# Patient Record
Sex: FEMALE | Race: WHITE | NOT HISPANIC OR LATINO | Employment: OTHER | URBAN - METROPOLITAN AREA
[De-identification: names, ages, dates, MRNs, and addresses within clinical notes are randomized per-mention and may not be internally consistent; named-entity substitution may affect disease eponyms.]

---

## 2023-03-27 ENCOUNTER — LAB (OUTPATIENT)
Dept: LAB | Facility: HOSPITAL | Age: 38
End: 2023-03-27
Payer: COMMERCIAL

## 2023-03-27 ENCOUNTER — TRANSCRIBE ORDERS (OUTPATIENT)
Dept: LAB | Facility: HOSPITAL | Age: 38
End: 2023-03-27
Payer: COMMERCIAL

## 2023-03-27 DIAGNOSIS — Z32.01 PREGNANCY EXAMINATION OR TEST, POSITIVE RESULT: ICD-10-CM

## 2023-03-27 DIAGNOSIS — N92.6 IRREGULAR MENSTRUAL CYCLE: ICD-10-CM

## 2023-03-27 DIAGNOSIS — N92.6 IRREGULAR MENSTRUAL CYCLE: Primary | ICD-10-CM

## 2023-03-27 PROCEDURE — 36415 COLL VENOUS BLD VENIPUNCTURE: CPT

## 2023-03-27 PROCEDURE — 84702 CHORIONIC GONADOTROPIN TEST: CPT

## 2023-03-27 PROCEDURE — 84144 ASSAY OF PROGESTERONE: CPT

## 2023-03-28 LAB
HCG INTACT+B SERPL-ACNC: NORMAL MIU/ML
PROGEST SERPL-MCNC: 8.5 NG/ML

## 2023-03-29 ENCOUNTER — LAB (OUTPATIENT)
Dept: LAB | Facility: HOSPITAL | Age: 38
End: 2023-03-29
Payer: COMMERCIAL

## 2023-03-29 ENCOUNTER — TRANSCRIBE ORDERS (OUTPATIENT)
Dept: LAB | Facility: HOSPITAL | Age: 38
End: 2023-03-29
Payer: COMMERCIAL

## 2023-03-29 DIAGNOSIS — N92.6 IRREGULAR MENSTRUAL CYCLE: Primary | ICD-10-CM

## 2023-03-29 DIAGNOSIS — Z32.01 PREGNANCY EXAMINATION OR TEST, POSITIVE RESULT: ICD-10-CM

## 2023-03-29 DIAGNOSIS — N92.6 IRREGULAR MENSTRUAL CYCLE: ICD-10-CM

## 2023-03-29 PROCEDURE — 36415 COLL VENOUS BLD VENIPUNCTURE: CPT

## 2023-03-29 PROCEDURE — 84702 CHORIONIC GONADOTROPIN TEST: CPT

## 2023-03-30 LAB — HCG INTACT+B SERPL-ACNC: NORMAL MIU/ML

## 2024-01-18 ENCOUNTER — TRANSCRIBE ORDERS (OUTPATIENT)
Dept: ADMINISTRATIVE | Facility: HOSPITAL | Age: 39
End: 2024-01-18
Payer: COMMERCIAL

## 2024-01-18 DIAGNOSIS — N83.202 LEFT OVARIAN CYST: Primary | ICD-10-CM

## 2024-03-29 ENCOUNTER — OFFICE VISIT (OUTPATIENT)
Dept: PULMONOLOGY | Facility: CLINIC | Age: 39
End: 2024-03-29
Payer: COMMERCIAL

## 2024-03-29 VITALS
OXYGEN SATURATION: 99 % | SYSTOLIC BLOOD PRESSURE: 100 MMHG | DIASTOLIC BLOOD PRESSURE: 66 MMHG | TEMPERATURE: 97.8 F | HEART RATE: 55 BPM | RESPIRATION RATE: 16 BRPM | WEIGHT: 158 LBS

## 2024-03-29 DIAGNOSIS — J84.10 CALCIFIED GRANULOMA OF LUNG: Primary | ICD-10-CM

## 2024-03-29 RX ORDER — PSEUDOEPHED/ACETAMINOPH/DIPHEN 30MG-500MG
500 TABLET ORAL DAILY PRN
COMMUNITY
Start: 2024-01-10

## 2024-03-29 RX ORDER — ONDANSETRON 4 MG/1
4 TABLET, FILM COATED ORAL EVERY 8 HOURS PRN
COMMUNITY
Start: 2024-03-15

## 2024-03-29 NOTE — PROGRESS NOTES
New Patient Pulmonary Office Visit      Patient Name: Hillary Arenas    Referring Physician: Ciara Noble MD    Chief Complaint:    Chief Complaint   Patient presents with    Abnormal Chest CT       History of Present Illness: Hillary Arenas is a 38 y.o. female who is here today to establish care with Pulmonary.  Patient has no significant past medical history.  Patient has had a trauma after being hit by a horse.  This led to some CT scans which showed some abnormal imaging.  Referred to pulmonary for further evaluation.  Patient has no current pulmonary complaints.    Review of Systems:   Review of Systems   Constitutional:  Negative for activity change, appetite change, chills and diaphoresis.   HENT:  Negative for congestion, postnasal drip, sinus pressure and voice change.    Eyes:  Negative for blurred vision.   Respiratory:  Negative for cough, shortness of breath and wheezing.    Cardiovascular:  Negative for chest pain.   Gastrointestinal:  Negative for abdominal pain.   Musculoskeletal:  Negative for myalgias.   Skin:  Negative for color change and dry skin.   Allergic/Immunologic: Negative for environmental allergies.   Neurological:  Negative for weakness and confusion.   Hematological:  Negative for adenopathy.   Psychiatric/Behavioral:  Negative for sleep disturbance and depressed mood.        Past Medical History: History reviewed. No pertinent past medical history.    Past Surgical History: History reviewed. No pertinent surgical history.    Family History:   Family History   Problem Relation Age of Onset    Hypertension Mother     Hypertension Father        Social History:   Social History     Socioeconomic History    Marital status:    Tobacco Use    Smoking status: Never     Passive exposure: Never    Smokeless tobacco: Never   Vaping Use    Vaping status: Never Used   Substance and Sexual Activity    Alcohol use: Not Currently     Comment: quit 2021    Drug use: Yes     Types:  Marijuana    Sexual activity: Defer       Medications:     Current Outpatient Medications:     Acetaminophen Extra Strength 500 MG tablet, Take 1 tablet by mouth Daily As Needed., Disp: , Rfl:     ondansetron (ZOFRAN) 4 MG tablet, Take 1 tablet by mouth Every 8 (Eight) Hours As Needed for Nausea., Disp: , Rfl:     Allergies:   No Known Allergies    Physical Exam:  Vital Signs:   Vitals:    03/29/24 1310   BP: 100/66   Pulse: 55   Resp: 16   Temp: 97.8 °F (36.6 °C)   SpO2: 99%  Comment: room air at rest   Weight: 71.7 kg (158 lb)       Physical Exam  Vitals and nursing note reviewed.   Constitutional:       General: She is not in acute distress.     Appearance: She is well-developed and normal weight. She is not ill-appearing or toxic-appearing.   HENT:      Head: Normocephalic and atraumatic.   Cardiovascular:      Rate and Rhythm: Normal rate and regular rhythm.      Pulses: Normal pulses.      Heart sounds: Normal heart sounds. No murmur heard.     No friction rub. No gallop.   Pulmonary:      Effort: Pulmonary effort is normal. No respiratory distress.      Breath sounds: Normal breath sounds. No wheezing, rhonchi or rales.   Musculoskeletal:      Right lower leg: No edema.      Left lower leg: No edema.   Skin:     General: Skin is warm and dry.   Neurological:      Mental Status: She is alert and oriented to person, place, and time.         Immunization History   Administered Date(s) Administered    COVID-19 (DIAZ) 03/31/2021       Results Review:   - I personally reviewed the pts imaging from CT scan of the chest from January 2024 showed no significant pulmonary nodules, the 14 mm left lower lobe calcified granuloma is benign.  - I personally reviewed the pts PFT from 3/29/2024 shows no obstruction or restriction with a normal DLCO.  - I personally reviewed the pts chart with regards to evaluation by [facility] for a trauma workup.    Assessment / Plan:   Diagnoses and all orders for this  visit:    1. Calcified granuloma of lung (Primary)  -I reviewed the images with the patient.  This is a benign lesion.  No further imaging is required.  Patient verbalized understanding agree with the plan.      Follow Up:   Return if symptoms worsen or fail to improve.     RHONDA Camacho, DO  Pulmonary and Critical Care Medicine  Note Electronically Signed    Part of this note may be an electronic transcription/translation of spoken language to printed text using the Dragon Dictation System.

## 2024-04-11 ENCOUNTER — TRANSCRIBE ORDERS (OUTPATIENT)
Dept: LAB | Facility: HOSPITAL | Age: 39
End: 2024-04-11
Payer: COMMERCIAL

## 2024-04-11 ENCOUNTER — LAB (OUTPATIENT)
Dept: LAB | Facility: HOSPITAL | Age: 39
End: 2024-04-11
Payer: COMMERCIAL

## 2024-04-11 DIAGNOSIS — Z34.81 PRENATAL CARE, SUBSEQUENT PREGNANCY, FIRST TRIMESTER: Primary | ICD-10-CM

## 2024-04-11 DIAGNOSIS — Z34.81 PRENATAL CARE, SUBSEQUENT PREGNANCY, FIRST TRIMESTER: ICD-10-CM

## 2024-04-11 LAB
ABO GROUP BLD: NORMAL
AMPHET+METHAMPHET UR QL: NEGATIVE
AMPHETAMINES UR QL: NEGATIVE
BARBITURATES UR QL SCN: NEGATIVE
BENZODIAZ UR QL SCN: NEGATIVE
BILIRUB UR QL STRIP: NEGATIVE
BLD GP AB SCN SERPL QL: NEGATIVE
BUPRENORPHINE SERPL-MCNC: NEGATIVE NG/ML
CANNABINOIDS SERPL QL: NEGATIVE
CLARITY UR: CLEAR
COCAINE UR QL: NEGATIVE
COLOR UR: YELLOW
DEPRECATED RDW RBC AUTO: 40 FL (ref 37–54)
ERYTHROCYTE [DISTWIDTH] IN BLOOD BY AUTOMATED COUNT: 11.7 % (ref 12.3–15.4)
FENTANYL UR-MCNC: NEGATIVE NG/ML
GLUCOSE UR STRIP-MCNC: NEGATIVE MG/DL
HBV SURFACE AG SERPL QL IA: NORMAL
HCT VFR BLD AUTO: 39.9 % (ref 34–46.6)
HCV AB SER DONR QL: NORMAL
HGB BLD-MCNC: 12.9 G/DL (ref 12–15.9)
HGB UR QL STRIP.AUTO: NEGATIVE
HIV 1+2 AB+HIV1 P24 AG SERPL QL IA: NORMAL
HOLD SPECIMEN: NORMAL
KETONES UR QL STRIP: ABNORMAL
LEUKOCYTE ESTERASE UR QL STRIP.AUTO: ABNORMAL
MCH RBC QN AUTO: 29.8 PG (ref 26.6–33)
MCHC RBC AUTO-ENTMCNC: 32.3 G/DL (ref 31.5–35.7)
MCV RBC AUTO: 92.1 FL (ref 79–97)
METHADONE UR QL SCN: NEGATIVE
NITRITE UR QL STRIP: NEGATIVE
OPIATES UR QL: NEGATIVE
OXYCODONE UR QL SCN: NEGATIVE
PCP UR QL SCN: NEGATIVE
PH UR STRIP.AUTO: 5.5 [PH] (ref 5–8)
PLATELET # BLD AUTO: 199 10*3/MM3 (ref 140–450)
PMV BLD AUTO: 10.9 FL (ref 6–12)
PROT UR QL STRIP: NEGATIVE
RBC # BLD AUTO: 4.33 10*6/MM3 (ref 3.77–5.28)
RH BLD: POSITIVE
SP GR UR STRIP: 1.02 (ref 1–1.03)
TRICYCLICS UR QL SCN: NEGATIVE
UROBILINOGEN UR QL STRIP: ABNORMAL
WBC NRBC COR # BLD AUTO: 7.67 10*3/MM3 (ref 3.4–10.8)

## 2024-04-11 PROCEDURE — 86762 RUBELLA ANTIBODY: CPT

## 2024-04-11 PROCEDURE — 86780 TREPONEMA PALLIDUM: CPT

## 2024-04-11 PROCEDURE — 36415 COLL VENOUS BLD VENIPUNCTURE: CPT

## 2024-04-11 PROCEDURE — 87591 N.GONORRHOEAE DNA AMP PROB: CPT

## 2024-04-11 PROCEDURE — 87491 CHLMYD TRACH DNA AMP PROBE: CPT

## 2024-04-11 PROCEDURE — 80307 DRUG TEST PRSMV CHEM ANLYZR: CPT

## 2024-04-11 PROCEDURE — 86850 RBC ANTIBODY SCREEN: CPT

## 2024-04-11 PROCEDURE — 86900 BLOOD TYPING SEROLOGIC ABO: CPT

## 2024-04-11 PROCEDURE — 87086 URINE CULTURE/COLONY COUNT: CPT

## 2024-04-11 PROCEDURE — 85027 COMPLETE CBC AUTOMATED: CPT

## 2024-04-11 PROCEDURE — 87340 HEPATITIS B SURFACE AG IA: CPT

## 2024-04-11 PROCEDURE — G0432 EIA HIV-1/HIV-2 SCREEN: HCPCS

## 2024-04-11 PROCEDURE — 86803 HEPATITIS C AB TEST: CPT

## 2024-04-11 PROCEDURE — 86901 BLOOD TYPING SEROLOGIC RH(D): CPT

## 2024-04-11 PROCEDURE — 81001 URINALYSIS AUTO W/SCOPE: CPT

## 2024-04-12 LAB
BACTERIA UR QL AUTO: ABNORMAL /HPF
HYALINE CASTS UR QL AUTO: ABNORMAL /LPF
RBC # UR STRIP: ABNORMAL /HPF
REF LAB TEST METHOD: ABNORMAL
RUBV IGG SERPL IA-ACNC: 2.02 INDEX
SQUAMOUS #/AREA URNS HPF: ABNORMAL /HPF
TREPONEMA PALLIDUM IGG+IGM AB [PRESENCE] IN SERUM OR PLASMA BY IMMUNOASSAY: NON REACTIVE
WBC # UR STRIP: ABNORMAL /HPF

## 2024-04-13 LAB — BACTERIA SPEC AEROBE CULT: ABNORMAL

## 2024-04-14 LAB
C TRACH RRNA SPEC QL NAA+PROBE: NEGATIVE
N GONORRHOEA RRNA SPEC QL NAA+PROBE: NEGATIVE

## 2024-04-15 LAB — REF LAB TEST RESULTS: NORMAL

## 2024-07-25 ENCOUNTER — LAB (OUTPATIENT)
Dept: LAB | Facility: HOSPITAL | Age: 39
End: 2024-07-25
Payer: COMMERCIAL

## 2024-07-25 ENCOUNTER — TRANSCRIBE ORDERS (OUTPATIENT)
Dept: LAB | Facility: HOSPITAL | Age: 39
End: 2024-07-25
Payer: COMMERCIAL

## 2024-07-25 DIAGNOSIS — Z3A.24 24 WEEKS GESTATION OF PREGNANCY: Primary | ICD-10-CM

## 2024-07-25 DIAGNOSIS — Z3A.24 24 WEEKS GESTATION OF PREGNANCY: ICD-10-CM

## 2024-07-25 LAB
BLD GP AB SCN SERPL QL: NEGATIVE
DEPRECATED RDW RBC AUTO: 39.7 FL (ref 37–54)
ERYTHROCYTE [DISTWIDTH] IN BLOOD BY AUTOMATED COUNT: 11.7 % (ref 12.3–15.4)
GLUCOSE 1H P 100 G GLC PO SERPL-MCNC: 121 MG/DL (ref 65–139)
HCT VFR BLD AUTO: 31.5 % (ref 34–46.6)
HGB BLD-MCNC: 10.5 G/DL (ref 12–15.9)
MCH RBC QN AUTO: 31 PG (ref 26.6–33)
MCHC RBC AUTO-ENTMCNC: 33.3 G/DL (ref 31.5–35.7)
MCV RBC AUTO: 92.9 FL (ref 79–97)
PLATELET # BLD AUTO: 194 10*3/MM3 (ref 140–450)
PMV BLD AUTO: 10.6 FL (ref 6–12)
RBC # BLD AUTO: 3.39 10*6/MM3 (ref 3.77–5.28)
WBC NRBC COR # BLD AUTO: 9.44 10*3/MM3 (ref 3.4–10.8)

## 2024-07-25 PROCEDURE — 82948 REAGENT STRIP/BLOOD GLUCOSE: CPT

## 2024-07-25 PROCEDURE — 36415 COLL VENOUS BLD VENIPUNCTURE: CPT

## 2024-07-25 PROCEDURE — 86850 RBC ANTIBODY SCREEN: CPT

## 2024-07-25 PROCEDURE — 82950 GLUCOSE TEST: CPT

## 2024-07-25 PROCEDURE — 85027 COMPLETE CBC AUTOMATED: CPT

## 2024-07-25 PROCEDURE — 86780 TREPONEMA PALLIDUM: CPT

## 2024-07-26 LAB — TREPONEMA PALLIDUM IGG+IGM AB [PRESENCE] IN SERUM OR PLASMA BY IMMUNOASSAY: NORMAL

## 2024-09-24 LAB — EXTERNAL GROUP B STREP ANTIGEN: POSITIVE

## 2024-10-03 ENCOUNTER — HOSPITAL ENCOUNTER (OUTPATIENT)
Facility: HOSPITAL | Age: 39
Discharge: HOME | End: 2024-10-03
Attending: OBSTETRICS & GYNECOLOGY | Admitting: OBSTETRICS & GYNECOLOGY
Payer: COMMERCIAL

## 2024-10-03 VITALS
BODY MASS INDEX: 27.11 KG/M2 | WEIGHT: 183 LBS | DIASTOLIC BLOOD PRESSURE: 75 MMHG | RESPIRATION RATE: 18 BRPM | HEIGHT: 69 IN | OXYGEN SATURATION: 97 % | TEMPERATURE: 97.9 F | SYSTOLIC BLOOD PRESSURE: 126 MMHG | HEART RATE: 62 BPM

## 2024-10-03 PROCEDURE — 59025 FETAL NON-STRESS TEST: CPT

## 2024-10-03 PROCEDURE — 84112 EVAL AMNIOTIC FLUID PROTEIN: CPT | Performed by: OBSTETRICS & GYNECOLOGY

## 2024-10-03 PROCEDURE — G0463 HOSPITAL OUTPT CLINIC VISIT: HCPCS

## 2024-10-03 PROCEDURE — 59025 FETAL NON-STRESS TEST: CPT | Performed by: OBSTETRICS & GYNECOLOGY

## 2024-10-03 PROCEDURE — 99203 OFFICE O/P NEW LOW 30 MIN: CPT | Performed by: OBSTETRICS & GYNECOLOGY

## 2024-10-03 RX ORDER — PRENATAL WITH FERROUS FUM AND FOLIC ACID 3080; 920; 120; 400; 22; 1.84; 3; 20; 10; 1; 12; 200; 27; 25; 2 [IU]/1; [IU]/1; MG/1; [IU]/1; MG/1; MG/1; MG/1; MG/1; MG/1; MG/1; UG/1; MG/1; MG/1; MG/1; MG/1
1 TABLET ORAL DAILY
COMMUNITY

## 2024-10-03 RX ORDER — FERROUS SULFATE 325(65) MG
325 TABLET ORAL
COMMUNITY

## 2024-10-03 NOTE — NURSING NOTE
Pt discharged home per MD order. RN instructed pt to return to labor zaragoza if any of the following occur: decreased fetal movement, vaginal bleeding, leaking of fluid. Pt verbalized understanding. Pt ambulating off unit in stable condition with spouse and all personal belongings.

## 2024-10-03 NOTE — DISCHARGE INSTRUCTIONS
Return to labor zaragoza if any of the following occur: decreased fetal movement, vaginal bleeding, leaking of fluid.

## 2024-10-03 NOTE — H&P
"[facility]  Obstetric History and Physical    Chief Complaint   Patient presents with    Rupture of Membranes     HPI:      Patient is a 38 y.o. female  currently at 37w2d, who presents with a concern that she may have broken her water.  She had a large gush about 1300 hours.  Nothing since or currently.  She denies vaginal bleeding and leaking.   +FM.   No regular contractions.       The following portions of the patients history were reviewed and updated as appropriate: current medications, allergies, past medical history, past surgical history, past family history, past social history and problem list .       Prenatal Information:   Maternal Prenatal Labs  Blood Type No results found for: \"ABO\"   Rh Status No results found for: \"RH\"   Antibody Screen No results found for: \"ABSCRN\"   Gonnorhea No results found for: \"GCCX\"   Chlamydia No results found for: \"CLAMYDCU\"   RPR No results found for: \"RPR\"   Syphilis Antibody No results found for: \"SYPHILIS\"   Rubella No results found for: \"RUBELLAIGGIN\"   Hepatitis B Surface Antigen No results found for: \"HEPBSAG\"   HIV-1 Antibody No results found for: \"LABHIV1\"   Hepatitis C Antibody No results found for: \"HEPCAB\"   Rapid Urin Drug Screen No results found for: \"AMPMETHU\", \"BARBITSCNUR\", \"LABBENZSCN\", \"LABMETHSCN\", \"LABOPIASCN\", \"THCURSCR\", \"COCAINEUR\", \"AMPHETSCREEN\", \"PROPOXSCN\", \"BUPRENORSCNU\", \"METAMPSCNUR\", \"OXYCODONESCN\", \"TRICYCLICSCN\"   Group B Strep Culture No results found for: \"GBSANTIGEN\"           External Prenatal Results       Pregnancy Outside Results - Transcribed From Office Records - See Scanned Records For Details       Test Value Date Time    ABO  A  24 1425    Rh  Positive  24 1425    Antibody Screen  Negative  24 1515       Negative  24 1425    Varicella IgG       Rubella  2.02 index 24 1425    Hgb  10.5 g/dL 24 1515       12.9 g/dL 24 1425    Hct  31.5 % 24 1515       39.9 % 24 1425 "    HgB A1c        1h GTT  121 mg/dL 24 1515    3h GTT Fasting       3h GTT 1 hour       3h GTT 2 hour       3h GTT 3 hour        Gonorrhea (discrete)  Negative  24 1425    Chlamydia (discrete)  Negative  24 1425    RPR       Syphils cascade: TP-Ab (FTA)  Non-Reactive  24 1515       Non Reactive  24 1425    TP-Ab  Non-Reactive  24 1515       Non Reactive  24 142    TP-Ab (EIA)       TPPA       HBsAg  Non-Reactive  24 142    Herpes Simplex Virus PCR       Herpes Simplex VIrus Culture       HIV  Non-Reactive  24 142    Hep C RNA Quant PCR       Hep C Antibody  Non-Reactive  24 142    AFP       NIPT       Cystic Fibroisis        Group B Strep       GBS Susceptibility to Clindamycin       GBS Susceptibility to Erythromycin       Fetal Fibronectin       Genetic Testing, Maternal Blood                 Drug Screening       Test Value Date Time    Urine Drug Screen       Amphetamine Screen  Negative  24 1425    Barbiturate Screen  Negative  24 1425    Benzodiazepine Screen  Negative  24 1425    Methadone Screen  Negative  24 1425    Phencyclidine Screen  Negative  24 1425    Opiates Screen  Negative  24 1425    THC Screen  Negative  24 1425    Cocaine Screen       Propoxyphene Screen       Buprenorphine Screen  Negative  24 1425    Methamphetamine Screen       Oxycodone Screen  Negative  24 1425    Tricyclic Antidepressants Screen  Negative  24 142              Legend    ^: Historical                              Past OB History:     OB History    Para Term  AB Living   2 1 1 0 0 1   SAB IAB Ectopic Molar Multiple Live Births   0 0 0 0 0 1      # Outcome Date GA Lbr Gilberto/2nd Weight Sex Type Anes PTL Lv   2 Current            1 Term 22   4054 g (8 lb 15 oz) F Vag-Spont EPI N ABELARDO       Past Medical History: History reviewed. No pertinent past medical history.   Past Surgical History  Past Surgical History:   Procedure Laterality Date    TONSILLECTOMY      WISDOM TOOTH EXTRACTION        Family History: Family History   Problem Relation Age of Onset    Hypertension Mother     Hypertension Father       Social History:  reports that she has never smoked. She has never been exposed to tobacco smoke. She has never used smokeless tobacco.   reports that she does not currently use alcohol.   reports current drug use. Drug: Marijuana.            Objective     Vital Signs Range for the last 24 hours  Temperature: Temp:  [97.9 °F (36.6 °C)] 97.9 °F (36.6 °C)   Temp Source: Temp src: Oral   BP: BP: (126)/(75) 126/75   Pulse: Heart Rate:  [62] 62   Respirations: Resp:  [18] 18   SPO2: SpO2:  [97 %] 97 %   O2 Amount (l/min):     O2 Devices     Weight: Weight:  [83 kg (183 lb)] 83 kg (183 lb)     Physical Examination: General appearance - alert, well appearing, and in no distress and oriented to person, place, and time  Chest - Breathing is unlaboured   Heart - regular rate   Abdomen - soft, nontender, nondistended,   no rebound tenderness noted  No guarding, No RUQ pain  Extremities - pedal edema +1      Fetal Heart Rate Assessment   Method: Fetal HR Assessment Method: external   Beats/min: Fetal HR (beats/min): 130   Baseline: Fetal HR Baseline: normal range   Varibility: Fetal HR Variability: moderate (amplitude range 6 to 25 bpm)   Accels: Fetal HR Accelerations: greater than/equal to 15 bpm, lasting at least 15 seconds   Decels: Fetal HR Decelerations: absent   Tracing Category:       Uterine Assessment   Method: Method: external tocotransducer   Frequency (min):     Ctx Count in 10 min:     Duration:     Intensity: Contraction Intensity: no contractions   Intensity by IUPC:     Resting Tone: Uterine Resting Tone: soft by palpation   Resting Tone by IUPC:     [place] Units:      NST                     Indication:  vaginal discharge   Start time 1425                 End time: 1450  15 x 15 accels x 2   Yes  No decels  Baseline  120s  Reactive NST - Yes     Laboratory Results:     AmniSure:  NEGATIVE         Assessment/Plan  1. Intrauterine pregnancy at 37w2d weeks gestation with reactive fetal status  2. Vaginal discharge without evidence of SROM.    AmniSure  - negative.   3. Given education and reassurance   4. Questions answered.  5. D/C home.         Amador Sanchez MD  10/3/2024  14:55 EDT

## 2024-10-08 LAB — POC AMNISURE: NEGATIVE

## 2024-10-08 PROCEDURE — 84112 EVAL AMNIOTIC FLUID PROTEIN: CPT | Performed by: OBSTETRICS & GYNECOLOGY

## 2024-10-22 ENCOUNTER — ANESTHESIA (OUTPATIENT)
Dept: LABOR AND DELIVERY | Facility: HOSPITAL | Age: 39
End: 2024-10-22
Payer: COMMERCIAL

## 2024-10-22 ENCOUNTER — ANESTHESIA EVENT (OUTPATIENT)
Dept: LABOR AND DELIVERY | Facility: HOSPITAL | Age: 39
End: 2024-10-22
Payer: COMMERCIAL

## 2024-10-22 ENCOUNTER — HOSPITAL ENCOUNTER (INPATIENT)
Facility: HOSPITAL | Age: 39
LOS: 2 days | Discharge: HOME | End: 2024-10-24
Attending: OBSTETRICS & GYNECOLOGY | Admitting: OBSTETRICS & GYNECOLOGY
Payer: COMMERCIAL

## 2024-10-22 PROBLEM — Z37.9 NORMAL LABOR: Status: ACTIVE | Noted: 2024-10-22

## 2024-10-22 LAB
ABO GROUP BLD: NORMAL
BLD GP AB SCN SERPL QL: NEGATIVE
DEPRECATED RDW RBC AUTO: 45.9 FL (ref 37–54)
ERYTHROCYTE [DISTWIDTH] IN BLOOD BY AUTOMATED COUNT: 13.4 % (ref 12.3–15.4)
HCT VFR BLD AUTO: 35.5 % (ref 34–46.6)
HGB BLD-MCNC: 12.4 G/DL (ref 12–15.9)
MCH RBC QN AUTO: 32.3 PG (ref 26.6–33)
MCHC RBC AUTO-ENTMCNC: 34.9 G/DL (ref 31.5–35.7)
MCV RBC AUTO: 92.4 FL (ref 79–97)
PLATELET # BLD AUTO: 198 10*3/MM3 (ref 140–450)
PMV BLD AUTO: 10.3 FL (ref 6–12)
RBC # BLD AUTO: 3.84 10*6/MM3 (ref 3.77–5.28)
RH BLD: POSITIVE
T&S EXPIRATION DATE: NORMAL
WBC NRBC COR # BLD AUTO: 10.51 10*3/MM3 (ref 3.4–10.8)

## 2024-10-22 PROCEDURE — 25810000003 LACTATED RINGERS SOLUTION: Performed by: ADVANCED PRACTICE MIDWIFE

## 2024-10-22 PROCEDURE — 86850 RBC ANTIBODY SCREEN: CPT | Performed by: ADVANCED PRACTICE MIDWIFE

## 2024-10-22 PROCEDURE — 25010000002 ONDANSETRON PER 1 MG: Performed by: ANESTHESIOLOGY

## 2024-10-22 PROCEDURE — 25010000002 LIDOCAINE-EPINEPHRINE 1.5 %-1:200000 SOLUTION: Performed by: ANESTHESIOLOGY

## 2024-10-22 PROCEDURE — 25010000002 FENTANYL CITRATE (PF) 50 MCG/ML SOLUTION: Performed by: ANESTHESIOLOGY

## 2024-10-22 PROCEDURE — 25010000002 ROPIVACAINE PER 1 MG: Performed by: ANESTHESIOLOGY

## 2024-10-22 PROCEDURE — 86780 TREPONEMA PALLIDUM: CPT | Performed by: ADVANCED PRACTICE MIDWIFE

## 2024-10-22 PROCEDURE — 85027 COMPLETE CBC AUTOMATED: CPT | Performed by: ADVANCED PRACTICE MIDWIFE

## 2024-10-22 PROCEDURE — 86901 BLOOD TYPING SEROLOGIC RH(D): CPT | Performed by: ADVANCED PRACTICE MIDWIFE

## 2024-10-22 PROCEDURE — 59025 FETAL NON-STRESS TEST: CPT

## 2024-10-22 PROCEDURE — 86900 BLOOD TYPING SEROLOGIC ABO: CPT | Performed by: ADVANCED PRACTICE MIDWIFE

## 2024-10-22 PROCEDURE — 51702 INSERT TEMP BLADDER CATH: CPT

## 2024-10-22 PROCEDURE — C1755 CATHETER, INTRASPINAL: HCPCS | Performed by: ANESTHESIOLOGY

## 2024-10-22 PROCEDURE — C1755 CATHETER, INTRASPINAL: HCPCS

## 2024-10-22 PROCEDURE — 25810000003 LACTATED RINGERS PER 1000 ML: Performed by: ADVANCED PRACTICE MIDWIFE

## 2024-10-22 PROCEDURE — 25010000002 BUPIVACAINE (PF) 0.5 % SOLUTION: Performed by: ANESTHESIOLOGY

## 2024-10-22 PROCEDURE — 25010000002 ONDANSETRON PER 1 MG: Performed by: ADVANCED PRACTICE MIDWIFE

## 2024-10-22 PROCEDURE — 25010000002 PENICILLIN G POTASSIUM PER 600000 UNITS: Performed by: ADVANCED PRACTICE MIDWIFE

## 2024-10-22 RX ORDER — ONDANSETRON 2 MG/ML
4 INJECTION INTRAMUSCULAR; INTRAVENOUS EVERY 6 HOURS PRN
Status: DISCONTINUED | OUTPATIENT
Start: 2024-10-22 | End: 2024-10-23

## 2024-10-22 RX ORDER — SODIUM CHLORIDE 0.9 % (FLUSH) 0.9 %
10 SYRINGE (ML) INJECTION AS NEEDED
Status: DISCONTINUED | OUTPATIENT
Start: 2024-10-22 | End: 2024-10-24 | Stop reason: HOSPADM

## 2024-10-22 RX ORDER — LIDOCAINE HYDROCHLORIDE 10 MG/ML
0.5 INJECTION, SOLUTION EPIDURAL; INFILTRATION; INTRACAUDAL; PERINEURAL ONCE AS NEEDED
Status: DISCONTINUED | OUTPATIENT
Start: 2024-10-22 | End: 2024-10-23 | Stop reason: HOSPADM

## 2024-10-22 RX ORDER — EPHEDRINE SULFATE 5 MG/ML
INJECTION INTRAVENOUS
Status: COMPLETED
Start: 2024-10-22 | End: 2024-10-22

## 2024-10-22 RX ORDER — DIPHENHYDRAMINE HYDROCHLORIDE 50 MG/ML
12.5 INJECTION INTRAMUSCULAR; INTRAVENOUS EVERY 8 HOURS PRN
Status: DISCONTINUED | OUTPATIENT
Start: 2024-10-22 | End: 2024-10-23 | Stop reason: HOSPADM

## 2024-10-22 RX ORDER — BUPIVACAINE HYDROCHLORIDE 5 MG/ML
INJECTION, SOLUTION EPIDURAL; INTRACAUDAL AS NEEDED
Status: DISCONTINUED | OUTPATIENT
Start: 2024-10-22 | End: 2024-10-23 | Stop reason: SURG

## 2024-10-22 RX ORDER — LIDOCAINE HYDROCHLORIDE AND EPINEPHRINE 15; 5 MG/ML; UG/ML
INJECTION, SOLUTION EPIDURAL AS NEEDED
Status: DISCONTINUED | OUTPATIENT
Start: 2024-10-22 | End: 2024-10-23 | Stop reason: SURG

## 2024-10-22 RX ORDER — FENTANYL CITRATE 50 UG/ML
INJECTION, SOLUTION INTRAMUSCULAR; INTRAVENOUS AS NEEDED
Status: DISCONTINUED | OUTPATIENT
Start: 2024-10-22 | End: 2024-10-23 | Stop reason: SURG

## 2024-10-22 RX ORDER — ROPIVACAINE HYDROCHLORIDE 2 MG/ML
15 INJECTION, SOLUTION EPIDURAL; INFILTRATION; PERINEURAL CONTINUOUS
Status: DISCONTINUED | OUTPATIENT
Start: 2024-10-22 | End: 2024-10-24 | Stop reason: HOSPADM

## 2024-10-22 RX ORDER — MAGNESIUM CARB/ALUMINUM HYDROX 105-160MG
30 TABLET,CHEWABLE ORAL ONCE
Status: DISCONTINUED | OUTPATIENT
Start: 2024-10-22 | End: 2024-10-23 | Stop reason: HOSPADM

## 2024-10-22 RX ORDER — FAMOTIDINE 20 MG/1
20 TABLET, FILM COATED ORAL 2 TIMES DAILY PRN
Status: DISCONTINUED | OUTPATIENT
Start: 2024-10-22 | End: 2024-10-23 | Stop reason: HOSPADM

## 2024-10-22 RX ORDER — EPHEDRINE SULFATE 5 MG/ML
10 INJECTION INTRAVENOUS
Status: DISCONTINUED | OUTPATIENT
Start: 2024-10-22 | End: 2024-10-23 | Stop reason: HOSPADM

## 2024-10-22 RX ORDER — SODIUM CHLORIDE, SODIUM LACTATE, POTASSIUM CHLORIDE, CALCIUM CHLORIDE 600; 310; 30; 20 MG/100ML; MG/100ML; MG/100ML; MG/100ML
125 INJECTION, SOLUTION INTRAVENOUS CONTINUOUS
Status: DISCONTINUED | OUTPATIENT
Start: 2024-10-22 | End: 2024-10-24 | Stop reason: HOSPADM

## 2024-10-22 RX ORDER — OXYTOCIN/0.9 % SODIUM CHLORIDE 30/500 ML
2-20 PLASTIC BAG, INJECTION (ML) INTRAVENOUS
Status: DISCONTINUED | OUTPATIENT
Start: 2024-10-22 | End: 2024-10-24 | Stop reason: HOSPADM

## 2024-10-22 RX ORDER — ACETAMINOPHEN 325 MG/1
650 TABLET ORAL EVERY 4 HOURS PRN
Status: DISCONTINUED | OUTPATIENT
Start: 2024-10-22 | End: 2024-10-23 | Stop reason: HOSPADM

## 2024-10-22 RX ORDER — METOCLOPRAMIDE HYDROCHLORIDE 5 MG/ML
10 INJECTION INTRAMUSCULAR; INTRAVENOUS ONCE AS NEEDED
Status: DISCONTINUED | OUTPATIENT
Start: 2024-10-22 | End: 2024-10-23 | Stop reason: HOSPADM

## 2024-10-22 RX ORDER — FAMOTIDINE 10 MG/ML
20 INJECTION, SOLUTION INTRAVENOUS 2 TIMES DAILY PRN
Status: DISCONTINUED | OUTPATIENT
Start: 2024-10-22 | End: 2024-10-23 | Stop reason: HOSPADM

## 2024-10-22 RX ORDER — CITRIC ACID/SODIUM CITRATE 334-500MG
30 SOLUTION, ORAL ORAL ONCE
Status: DISCONTINUED | OUTPATIENT
Start: 2024-10-22 | End: 2024-10-23 | Stop reason: HOSPADM

## 2024-10-22 RX ORDER — ONDANSETRON 2 MG/ML
4 INJECTION INTRAMUSCULAR; INTRAVENOUS ONCE AS NEEDED
Status: COMPLETED | OUTPATIENT
Start: 2024-10-22 | End: 2024-10-22

## 2024-10-22 RX ORDER — SODIUM CHLORIDE 0.9 % (FLUSH) 0.9 %
10 SYRINGE (ML) INJECTION EVERY 12 HOURS SCHEDULED
Status: DISCONTINUED | OUTPATIENT
Start: 2024-10-22 | End: 2024-10-23 | Stop reason: HOSPADM

## 2024-10-22 RX ORDER — SODIUM CHLORIDE, SODIUM LACTATE, POTASSIUM CHLORIDE, CALCIUM CHLORIDE 600; 310; 30; 20 MG/100ML; MG/100ML; MG/100ML; MG/100ML
125 INJECTION, SOLUTION INTRAVENOUS CONTINUOUS
Status: ACTIVE | OUTPATIENT
Start: 2024-10-22 | End: 2024-10-23

## 2024-10-22 RX ORDER — SODIUM CHLORIDE 0.9 % (FLUSH) 0.9 %
10 SYRINGE (ML) INJECTION EVERY 12 HOURS SCHEDULED
Status: DISCONTINUED | OUTPATIENT
Start: 2024-10-22 | End: 2024-10-24 | Stop reason: HOSPADM

## 2024-10-22 RX ORDER — PENICILLIN G 3000000 [IU]/50ML
3 INJECTION, SOLUTION INTRAVENOUS EVERY 4 HOURS
Status: DISCONTINUED | OUTPATIENT
Start: 2024-10-22 | End: 2024-10-23 | Stop reason: HOSPADM

## 2024-10-22 RX ORDER — SODIUM CHLORIDE 0.9 % (FLUSH) 0.9 %
10 SYRINGE (ML) INJECTION AS NEEDED
Status: DISCONTINUED | OUTPATIENT
Start: 2024-10-22 | End: 2024-10-23 | Stop reason: HOSPADM

## 2024-10-22 RX ORDER — FAMOTIDINE 10 MG/ML
20 INJECTION, SOLUTION INTRAVENOUS ONCE AS NEEDED
Status: DISCONTINUED | OUTPATIENT
Start: 2024-10-22 | End: 2024-10-23 | Stop reason: HOSPADM

## 2024-10-22 RX ADMIN — ROPIVACAINE HYDROCHLORIDE 15 ML/HR: 2 INJECTION, SOLUTION EPIDURAL; INFILTRATION at 20:02

## 2024-10-22 RX ADMIN — ONDANSETRON 4 MG: 2 INJECTION INTRAMUSCULAR; INTRAVENOUS at 20:15

## 2024-10-22 RX ADMIN — FENTANYL CITRATE 100 MCG: 50 INJECTION, SOLUTION INTRAMUSCULAR; INTRAVENOUS at 20:02

## 2024-10-22 RX ADMIN — EPHEDRINE SULFATE 10 MG: 5 INJECTION INTRAVENOUS at 21:43

## 2024-10-22 RX ADMIN — EPHEDRINE SULFATE 10 MG: 5 INJECTION INTRAVENOUS at 23:59

## 2024-10-22 RX ADMIN — ONDANSETRON 4 MG: 2 INJECTION INTRAMUSCULAR; INTRAVENOUS at 23:57

## 2024-10-22 RX ADMIN — LIDOCAINE HYDROCHLORIDE AND EPINEPHRINE 3 ML: 15; 5 INJECTION, SOLUTION EPIDURAL at 20:02

## 2024-10-22 RX ADMIN — BUPIVACAINE HYDROCHLORIDE 5 ML: 5 INJECTION, SOLUTION EPIDURAL; INTRACAUDAL at 20:02

## 2024-10-22 RX ADMIN — SODIUM CHLORIDE 5 MILLION UNITS: 900 INJECTION INTRAVENOUS at 19:21

## 2024-10-22 RX ADMIN — Medication 2 MILLI-UNITS/MIN: at 21:21

## 2024-10-22 RX ADMIN — PENICILLIN G 3 MILLION UNITS: 3000000 INJECTION, SOLUTION INTRAVENOUS at 23:01

## 2024-10-22 RX ADMIN — EPHEDRINE SULFATE 10 MG: 5 INJECTION INTRAVENOUS at 20:10

## 2024-10-22 RX ADMIN — SODIUM CHLORIDE, POTASSIUM CHLORIDE, SODIUM LACTATE AND CALCIUM CHLORIDE 1000 ML: 600; 310; 30; 20 INJECTION, SOLUTION INTRAVENOUS at 20:14

## 2024-10-22 RX ADMIN — SODIUM CHLORIDE, POTASSIUM CHLORIDE, SODIUM LACTATE AND CALCIUM CHLORIDE 999 ML/HR: 600; 310; 30; 20 INJECTION, SOLUTION INTRAVENOUS at 19:13

## 2024-10-22 NOTE — H&P
[facility]  Obstetric History and Physical    Chief Complaint   Patient presents with    Contractions     Membranes sweep today       Subjective     Patient is a 38 y.o. female  currently at 40w0d, who presents for term labor  without ROM. On intake reports contractions, denies  leakage of fluid, denies  vaginal bleeding. reports fetal movement.    Her prenatal care is benign.  Her previous obstetric/gynecological history is noted for is remarkable for  x 1    The following portions of the patients history were reviewed and updated as appropriate: past medical history, past surgical history, past family history, and past social history .       Prenatal Information:  Prenatal Results       Initial Prenatal Labs       Test Value Reference Range Date Time    Hemoglobin  12.9 g/dL 12.0 - 15.9 24 1425    Hematocrit  39.9 % 34.0 - 46.6 24 1425    Platelets  199 10*3/mm3 140 - 450 24 1425    Rubella IgG  2.02 index Immune >0.99 24 1425    Hepatitis B SAg  Non-Reactive  Non-Reactive 24 1425    Hepatitis C Ab  Non-Reactive  Non-Reactive 24 1425    RPR        T. Pallidum Ab   Non-Reactive  Non-Reactive 24 1515       Non Reactive  Non Reactive 24 1425    ABO  A   24 1425    Rh  Positive   24 1425    Antibody Screen  Negative   24 1425    HIV  Non-Reactive  Non-Reactive 24 1425    Urine Culture  >100,000 CFU/mL Lactobacillus species   24 1425    Gonorrhea  Negative  Negative 24 1425    Chlamydia  Negative  Negative 24 1425    TSH        HgB A1c         Varicella IgG        Hemoglobinopathy Fractionation        Hemoglobinopathy (genetic testing)        Cystic fibrosis                   Fetal testing        Test Value Reference Range Date Time    NIPT        MSAFP        AFP-4                  2nd and 3rd Trimester       Test Value Reference Range Date Time    Hemoglobin (repeated)  12.4 g/dL 12.0 - 15.9 10/22/24 1912       10.5  g/dL 12.0 - 15.9 24 1515    Hematocrit (repeated)  35.5 % 34.0 - 46.6 10/22/24 191       31.5 % 34.0 - 46.6 24 1515    Platelets   198 10*3/mm3 140 - 450 10/22/24 191       194 10*3/mm3 140 - 450 24 1515       199 10*3/mm3 140 - 450 24 1425    1 hour GTT   121 mg/dL 65 - 139 24 1515    Antibody Screen (repeated)  Negative   24 1515    3rd TM syphilis scrn (repeated)  RPR         3rd TM syphilis scrn (repeated) TP-Ab  Non-Reactive  Non-Reactive 24 151    3rd TM syphilis screen TB-Ab (FTA)  Non-Reactive  Non-Reactive 24 151    Syphilis cascade test TP-Ab (EIA)        Syphilis cascade TPPA        GTT Fasting        GTT 1 Hr        GTT 2 Hr        GTT 3 Hr        Group B Strep                  Other testing        Test Value Reference Range Date Time    Parvo IgG         CMV IgG                   Drug Screening       Test Value Reference Range Date Time    Amphetamine Screen  Negative  Negative 24 1425    Barbiturate Screen  Negative  Negative 24 1425    Benzodiazepine Screen  Negative  Negative 24 1425    Methadone Screen  Negative  Negative 24 1425    Phencyclidine Screen  Negative  Negative 24 1425    Opiates Screen  Negative  Negative 24 1425    THC Screen  Negative  Negative 24 1425    Cocaine Screen  Negative  Negative 24 1425    Propoxyphene Screen        Buprenorphine Screen  Negative  Negative 24 1425    Methamphetamine Screen  Negative  Negative 24 1425    Oxycodone Screen  Negative  Negative 24 1425    Tricyclic Antidepressants Screen  Negative  Negative 24 142              Legend    ^: Historical                               Past OB History:       OB History    Para Term  AB Living   2 1 1 0 0 1   SAB IAB Ectopic Molar Multiple Live Births   0 0 0 0 0 1      # Outcome Date GA Lbr Gilberto/2nd Weight Sex Type Anes PTL Lv   2 Current            1 Term 22   4054 g (8 lb 15  oz) F Vag-Spont EPI N ABELARDO       Past Medical History: History reviewed. No pertinent past medical history.   Past Surgical History Past Surgical History:   Procedure Laterality Date    TONSILLECTOMY      WISDOM TOOTH EXTRACTION        Family History: Family History   Problem Relation Age of Onset    Hypertension Mother     Hypertension Father       Social History:  reports that she has never smoked. She has never been exposed to tobacco smoke. She has never used smokeless tobacco.   reports that she does not currently use alcohol.   reports no history of drug use.        REVIEW OF SYSTEMS              Reports fetal movement is normal             Denies leakage of amniotic fluid.             Denies vaginal bleeding             She reports  Regular contractions, frequency: Every 5-7 minutes, intensity moderate             All other systems reviewed and are negative    Objective       Vital Signs Range for the last 24 hours  Temperature: Temp:  [97.7 °F (36.5 °C)] 97.7 °F (36.5 °C)   Temp Source: Temp src: Oral   BP: BP: (120)/(66) 120/66   Pulse: Heart Rate:  [70] 70   Respirations: Resp:  [20] 20   SPO2:     O2 Amount (l/min):     O2 Devices     Weight: Weight:  [84.8 kg (187 lb)] 84.8 kg (187 lb)       Presentation: vertex   Cervix: Exam by: Method: sterile vaginal exam performed   Dilation: Cervical Dilation (cm): 4-5   Effacement: Cervical Effacement: 70   Station: Fetal Station: -1        Fetal Heart Rate Assessment   Method: Fetal HR Assessment Method: external   Beats/min: Fetal HR (beats/min): 130   Baseline: Fetal HR Baseline: normal range   Varibility: Fetal HR Variability: moderate (amplitude range 6 to 25 bpm)   Accels: Fetal HR Accelerations: greater than/equal to 15 bpm, lasting at least 15 seconds   Decels: Fetal HR Decelerations: absent   Tracing Category:       Uterine Assessment   Method: Method: external tocotransducer   Frequency (min): Contraction Frequency (Minutes): 2.5-4   Ctx Count in 10 min:      Duration:     Intensity: Contraction Intensity: moderate by palpation   Intensity by IUPC:     Resting Tone: Uterine Resting Tone: soft by palpation   Resting Tone by IUPC:     [place] Units:         Constitutional:  Well developed, well nourished, no acute distress, well-groomed.   Respiratory:  Lungs are clear to auscultation bilaterally, normal breath sounds.   Cardiovascular:  Normal rate and rhythm, no murmurs.   Gastrointestinal:  Soft, gravid, nontender.  Uterus: Soft, nontender. Fundus appropriate for dates.  Neurologic:  Alert & oriented x 3,  no focal deficits noted.   Psychiatric:  Speech and behavior appropriate.   Extremities: no cyanosis, clubbing or edema, no evidence of DVT.        Labs:  Lab Results   Component Value Date    WBC 10.51 10/22/2024    HGB 12.4 10/22/2024    HCT 35.5 10/22/2024    MCV 92.4 10/22/2024     10/22/2024               Assessment & Plan       Normal labor        Assessment:   IUP at 40w0d weeks gestation with reactive fetal status.    2.   term labor  without ROM  3.   Obstetrical history significant for is remarkable for  x 1  4.   GBS status:positive, antibiotics started  5.   Rh: positive    Plan:  Expectant management  Continuous FHT and TOCO monitoring.   Diet:clear liquids  Desires epidural for anesthesia.   Plan of care has been reviewed with patient and questions answered.  Risks, benefits of treatment plan have been discussed.   Consent obtained to proceed with labor management and subsequent delivery of baby.        Peg Barker CNM  10/22/2024  19:40 EDT

## 2024-10-23 PROBLEM — Z37.9 NORMAL LABOR: Status: RESOLVED | Noted: 2024-10-22 | Resolved: 2024-10-23

## 2024-10-23 LAB — TREPONEMA PALLIDUM IGG+IGM AB [PRESENCE] IN SERUM OR PLASMA BY IMMUNOASSAY: NORMAL

## 2024-10-23 PROCEDURE — 0KQM0ZZ REPAIR PERINEUM MUSCLE, OPEN APPROACH: ICD-10-PCS | Performed by: ADVANCED PRACTICE MIDWIFE

## 2024-10-23 RX ORDER — ACETAMINOPHEN 325 MG/1
650 TABLET ORAL EVERY 6 HOURS PRN
Status: DISCONTINUED | OUTPATIENT
Start: 2024-10-23 | End: 2024-10-24 | Stop reason: HOSPADM

## 2024-10-23 RX ORDER — HYDROCODONE BITARTRATE AND ACETAMINOPHEN 10; 325 MG/1; MG/1
1 TABLET ORAL EVERY 4 HOURS PRN
Status: DISCONTINUED | OUTPATIENT
Start: 2024-10-23 | End: 2024-10-24 | Stop reason: HOSPADM

## 2024-10-23 RX ORDER — PROMETHAZINE HYDROCHLORIDE 12.5 MG/1
12.5 TABLET ORAL EVERY 6 HOURS PRN
Status: DISCONTINUED | OUTPATIENT
Start: 2024-10-23 | End: 2024-10-23 | Stop reason: HOSPADM

## 2024-10-23 RX ORDER — DOCUSATE SODIUM 100 MG/1
100 CAPSULE, LIQUID FILLED ORAL 2 TIMES DAILY
Status: DISCONTINUED | OUTPATIENT
Start: 2024-10-23 | End: 2024-10-24 | Stop reason: HOSPADM

## 2024-10-23 RX ORDER — OXYTOCIN/0.9 % SODIUM CHLORIDE 30/500 ML
125 PLASTIC BAG, INJECTION (ML) INTRAVENOUS ONCE AS NEEDED
Status: DISCONTINUED | OUTPATIENT
Start: 2024-10-23 | End: 2024-10-24 | Stop reason: HOSPADM

## 2024-10-23 RX ORDER — IBUPROFEN 600 MG/1
600 TABLET, FILM COATED ORAL EVERY 6 HOURS SCHEDULED
Status: DISCONTINUED | OUTPATIENT
Start: 2024-10-23 | End: 2024-10-23 | Stop reason: HOSPADM

## 2024-10-23 RX ORDER — ONDANSETRON 2 MG/ML
4 INJECTION INTRAMUSCULAR; INTRAVENOUS EVERY 6 HOURS PRN
Status: DISCONTINUED | OUTPATIENT
Start: 2024-10-23 | End: 2024-10-23

## 2024-10-23 RX ORDER — CARBOPROST TROMETHAMINE 250 UG/ML
250 INJECTION, SOLUTION INTRAMUSCULAR
Status: DISCONTINUED | OUTPATIENT
Start: 2024-10-23 | End: 2024-10-23 | Stop reason: HOSPADM

## 2024-10-23 RX ORDER — OXYTOCIN/0.9 % SODIUM CHLORIDE 30/500 ML
250 PLASTIC BAG, INJECTION (ML) INTRAVENOUS CONTINUOUS
Status: ACTIVE | OUTPATIENT
Start: 2024-10-23 | End: 2024-10-23

## 2024-10-23 RX ORDER — ONDANSETRON 4 MG/1
4 TABLET, ORALLY DISINTEGRATING ORAL EVERY 6 HOURS PRN
Status: DISCONTINUED | OUTPATIENT
Start: 2024-10-23 | End: 2024-10-23

## 2024-10-23 RX ORDER — OXYTOCIN/0.9 % SODIUM CHLORIDE 30/500 ML
999 PLASTIC BAG, INJECTION (ML) INTRAVENOUS ONCE
Status: DISCONTINUED | OUTPATIENT
Start: 2024-10-23 | End: 2024-10-23 | Stop reason: HOSPADM

## 2024-10-23 RX ORDER — SODIUM CHLORIDE 0.9 % (FLUSH) 0.9 %
1-10 SYRINGE (ML) INJECTION AS NEEDED
Status: DISCONTINUED | OUTPATIENT
Start: 2024-10-23 | End: 2024-10-24 | Stop reason: HOSPADM

## 2024-10-23 RX ORDER — MISOPROSTOL 200 UG/1
800 TABLET ORAL ONCE AS NEEDED
Status: DISCONTINUED | OUTPATIENT
Start: 2024-10-23 | End: 2024-10-23 | Stop reason: HOSPADM

## 2024-10-23 RX ORDER — ONDANSETRON 2 MG/ML
4 INJECTION INTRAMUSCULAR; INTRAVENOUS EVERY 6 HOURS PRN
Status: DISCONTINUED | OUTPATIENT
Start: 2024-10-23 | End: 2024-10-24 | Stop reason: HOSPADM

## 2024-10-23 RX ORDER — HYDROCORTISONE 25 MG/G
1 CREAM TOPICAL AS NEEDED
Status: DISCONTINUED | OUTPATIENT
Start: 2024-10-23 | End: 2024-10-24 | Stop reason: HOSPADM

## 2024-10-23 RX ORDER — IBUPROFEN 600 MG/1
600 TABLET, FILM COATED ORAL EVERY 6 HOURS PRN
Status: DISCONTINUED | OUTPATIENT
Start: 2024-10-23 | End: 2024-10-24 | Stop reason: HOSPADM

## 2024-10-23 RX ORDER — PROMETHAZINE HYDROCHLORIDE 12.5 MG/1
12.5 SUPPOSITORY RECTAL EVERY 6 HOURS PRN
Status: DISCONTINUED | OUTPATIENT
Start: 2024-10-23 | End: 2024-10-23 | Stop reason: HOSPADM

## 2024-10-23 RX ORDER — BISACODYL 10 MG
10 SUPPOSITORY, RECTAL RECTAL DAILY PRN
Status: DISCONTINUED | OUTPATIENT
Start: 2024-10-24 | End: 2024-10-24 | Stop reason: HOSPADM

## 2024-10-23 RX ORDER — METHYLERGONOVINE MALEATE 0.2 MG/ML
200 INJECTION INTRAVENOUS ONCE AS NEEDED
Status: DISCONTINUED | OUTPATIENT
Start: 2024-10-23 | End: 2024-10-23 | Stop reason: HOSPADM

## 2024-10-23 RX ORDER — HYDROCODONE BITARTRATE AND ACETAMINOPHEN 5; 325 MG/1; MG/1
1 TABLET ORAL EVERY 4 HOURS PRN
Status: DISCONTINUED | OUTPATIENT
Start: 2024-10-23 | End: 2024-10-24 | Stop reason: HOSPADM

## 2024-10-23 RX ADMIN — WITCH HAZEL: 500 SOLUTION RECTAL; TOPICAL at 03:53

## 2024-10-23 RX ADMIN — IBUPROFEN 600 MG: 600 TABLET, FILM COATED ORAL at 21:18

## 2024-10-23 RX ADMIN — Medication 1 APPLICATION: at 03:53

## 2024-10-23 RX ADMIN — Medication 10 ML: at 08:56

## 2024-10-23 RX ADMIN — BENZOCAINE AND LEVOMENTHOL: 200; 5 SPRAY TOPICAL at 03:53

## 2024-10-23 RX ADMIN — DOCUSATE SODIUM 100 MG: 100 CAPSULE, LIQUID FILLED ORAL at 08:56

## 2024-10-23 RX ADMIN — DOCUSATE SODIUM 100 MG: 100 CAPSULE, LIQUID FILLED ORAL at 21:18

## 2024-10-23 RX ADMIN — WITCH HAZEL: 500 SOLUTION RECTAL; TOPICAL at 21:19

## 2024-10-23 RX ADMIN — IBUPROFEN 600 MG: 600 TABLET, FILM COATED ORAL at 15:20

## 2024-10-23 RX ADMIN — Medication 1 APPLICATION: at 22:20

## 2024-10-23 RX ADMIN — ACETAMINOPHEN 650 MG: 325 TABLET ORAL at 10:55

## 2024-10-23 RX ADMIN — IBUPROFEN 600 MG: 600 TABLET, FILM COATED ORAL at 08:56

## 2024-10-23 RX ADMIN — ACETAMINOPHEN 650 MG: 325 TABLET ORAL at 03:52

## 2024-10-23 RX ADMIN — BENZOCAINE AND LEVOMENTHOL: 200; 5 SPRAY TOPICAL at 21:19

## 2024-10-23 NOTE — PROGRESS NOTES
10/23/2024  PPD #0    Subjective   Hillary feels well.  Patient describes her lochia as less than menses.  Pain is well controlled       Objective   Temp: Temp:  [97.5 °F (36.4 °C)-98.2 °F (36.8 °C)] 98 °F (36.7 °C) Temp src: Oral   BP: BP: ()/(51-78) 110/60        Pulse: Heart Rate:  [54-82] 58  RR: Resp:  [16-20] 16    General:  No acute distress   Abdomen: Fundus firm and beneath umbilicus   Pelvis: deferred     Lab Results   Component Value Date    WBC 10.51 10/22/2024    HGB 12.4 10/22/2024    HCT 35.5 10/22/2024    MCV 92.4 10/22/2024     10/22/2024    HEPBSAG Non-Reactive 04/11/2024       Assessment  PPD# 0 after vaginal delivery    Plan  Supportive care, anticipate discharge in am.      This note has been electronically signed.    Ladi Hubbard MD  11:31 EDT  October 23, 2024

## 2024-10-23 NOTE — LACTATION NOTE
10/23/24 0840   Maternal Information   Date of Referral 10/23/24   Person Making Referral lactation consultant   Maternal Reason for Referral breastfeeding currently   Infant Reason for Referral  infant   Maternal Assessment   Left Nipple Symptoms nontender   Right Nipple Symptoms nontender   Maternal Infant Feeding   Maternal Emotional State receptive;independent   Pain with Feeding no   Latch Assistance none needed   Milk Expression/Equipment   Breast Pump Type double electric, personal  (hands free and plug in doesnt remember brands)   Equipment for Home Use breast pump ordered through insurance   Breast Pumping   Breast Pumping Interventions other (see comments)  (encouraged to pump for short or missed feedings and with supplementation)   Lactation Referrals   Lactation Referrals outpatient lactation program   Outpatient Lactation Program Lactation Follow-up Date/Time as needed     Courtesy visit for newly postpartum couplet. MOB reports breast feeding first child for 7 mo without difficulty. Reports this baby has latched well so far. Educational handout provided and reviewed. Encouraged to call for latch check and as needs arise.

## 2024-10-23 NOTE — ANESTHESIA POSTPROCEDURE EVALUATION
Patient: Hillary Arenas    Procedure Summary       Date: 10/22/24 Room / Location:     Anesthesia Start: 1943 Anesthesia Stop: 10/23/24 0020    Procedure: LABOR ANALGESIA Diagnosis:     Scheduled Providers:  Provider: Blayne Toro MD    Anesthesia Type: epidural ASA Status: 2 - Emergent            Anesthesia Type: epidural    Vitals  Vitals Value Taken Time   /60 10/23/24 0725   Temp 98 °F (36.7 °C) 10/23/24 0725   Pulse 58 10/23/24 0725   Resp 16 10/23/24 0725   SpO2             Post Anesthesia Care and Evaluation    Patient location during evaluation: bedside  Patient participation: complete - patient participated  Level of consciousness: awake  Pain score: 0  Pain management: satisfactory to patient    Airway patency: patent  Anesthetic complications: No anesthetic complications  PONV Status: none  Cardiovascular status: acceptable and hemodynamically stable  Respiratory status: acceptable  Hydration status: acceptable  Post Neuraxial Block status: Motor and sensory function returned to baseline and No signs or symptoms of PDPH

## 2024-10-23 NOTE — L&D DELIVERY NOTE
[facility]   Vaginal Delivery Note    Patient Name: Hillary Arenas  : 1985  MRN: [id number]    Date of Delivery: 10/23/2024    Diagnosis     Pre & Post-Delivery:  Intrauterine pregnancy at 40w1d  Labor status: Spontaneous Onset of Labor     (normal spontaneous vaginal delivery)             Problem List    Transfer to Postpartum     Review the Delivery Report for details.     Delivery     Delivery:      YOB: 2022   Time of Birth:  Gestational Age 12:14 AM  40w1d     Anesthesia: Epidural    Delivering clinician: Peg Barker   Forceps?   No   Vacuum? No    Shoulder dystocia present: No        Delivery narrative:  Progressed to complete  at 40w1d and pushed to deliver a viable female infant over a 2nd degree laceration. Anterior shoulder delivered with ease. Infant vigorous at delivery so placed on maternal abdomen. Delayed cord clamping x 1 min. Cord doubly clamped and cut. Cord blood and cord segment obtained. Placenta delivered spontaneously and appeared intact. Pitocin to IVF. Laceration repaired in standard fashion using 2-0 vicryl         Infant     Findings: female infant     Infant observations: Weight: 4140 g (9 lb 2 oz)  Length: 20.25 in  Observations/Comments:        Apgars: 8  @ 1 minute /    9  @ 5 minutes   Infant Name: Sandy Fournier     Placenta & Cord         Placenta delivered  Spontaneous at   10/23/2024 12:20 AM    Cord:   present.   Nuchal Cord?  no   Cord blood obtained:     Cord gases obtained:      Cord gas results: N/a         Repair     Episiotomy: None    No    Lacerations: Yes  Laceration Information  Laceration Repaired?   Perineal: 2nd Yes   Periurethral:       Labial:       Sulcus:       Vaginal:       Cervical:         Suture used for repair: 2-0 chromic gut     Estimated Blood Loss: Est. Blood Loss (mL): 300 mL (10/23/24 0035)     Quantitative Blood Loss:          Complications     none    Disposition     Mother to Mother  Baby/Postpartum  in stable condition currently.  Baby to remains with mom  in stable condition currently.    Peg Barker CNM  10/23/24  00:50 EDT

## 2024-10-23 NOTE — ANESTHESIA PROCEDURE NOTES
Labor Epidural      Patient reassessed immediately prior to procedure    Patient location during procedure: OB  Start Time: 10/22/2024 7:43 PM  Stop Time: 10/22/2024 8:02 PM  Performed By  Anesthesiologist: Blayne Toro MD  Preanesthetic Checklist  Completed: patient identified, IV checked, site marked, risks and benefits discussed, surgical consent, monitors and equipment checked, pre-op evaluation and timeout performed  Additional Notes  dpe  Prep:  Pt Position:sitting  Sterile Tech:cap, gloves, mask and sterile barrier  Prep:povidone-iodine 7.5% surgical scrub  Monitoring:blood pressure monitoring  Epidural Block Procedure:  Approach:midline  Guidance:landmark technique  Location:L3-L4  Needle Type:Tuohy  Needle Gauge:17 G  Loss of Resistance Medium: air  Loss of Resistance: 5cm  Cath Depth at skin:15 cm  Paresthesia: none  Aspiration:negative  Test Dose:negative  Number of Attempts: 1  Post Assessment:  Dressing:occlusive dressing applied and secured with tape  Pt Tolerance:patient tolerated the procedure well with no apparent complications  Complications:no

## 2024-10-24 VITALS
DIASTOLIC BLOOD PRESSURE: 65 MMHG | WEIGHT: 187 LBS | SYSTOLIC BLOOD PRESSURE: 110 MMHG | TEMPERATURE: 98.2 F | RESPIRATION RATE: 16 BRPM | HEART RATE: 51 BPM | BODY MASS INDEX: 27.7 KG/M2 | HEIGHT: 69 IN

## 2024-10-24 LAB
BASOPHILS # BLD AUTO: 0.04 10*3/MM3 (ref 0–0.2)
BASOPHILS NFR BLD AUTO: 0.5 % (ref 0–1.5)
DEPRECATED RDW RBC AUTO: 48.9 FL (ref 37–54)
EOSINOPHIL # BLD AUTO: 0.11 10*3/MM3 (ref 0–0.4)
EOSINOPHIL NFR BLD AUTO: 1.3 % (ref 0.3–6.2)
ERYTHROCYTE [DISTWIDTH] IN BLOOD BY AUTOMATED COUNT: 13.7 % (ref 12.3–15.4)
HCT VFR BLD AUTO: 34.5 % (ref 34–46.6)
HGB BLD-MCNC: 11.5 G/DL (ref 12–15.9)
IMM GRANULOCYTES # BLD AUTO: 0.06 10*3/MM3 (ref 0–0.05)
IMM GRANULOCYTES NFR BLD AUTO: 0.7 % (ref 0–0.5)
LYMPHOCYTES # BLD AUTO: 2.15 10*3/MM3 (ref 0.7–3.1)
LYMPHOCYTES NFR BLD AUTO: 24.9 % (ref 19.6–45.3)
MCH RBC QN AUTO: 31.9 PG (ref 26.6–33)
MCHC RBC AUTO-ENTMCNC: 33.3 G/DL (ref 31.5–35.7)
MCV RBC AUTO: 95.8 FL (ref 79–97)
MONOCYTES # BLD AUTO: 0.47 10*3/MM3 (ref 0.1–0.9)
MONOCYTES NFR BLD AUTO: 5.4 % (ref 5–12)
NEUTROPHILS NFR BLD AUTO: 5.81 10*3/MM3 (ref 1.7–7)
NEUTROPHILS NFR BLD AUTO: 67.2 % (ref 42.7–76)
NRBC BLD AUTO-RTO: 0 /100 WBC (ref 0–0.2)
PLATELET # BLD AUTO: 152 10*3/MM3 (ref 140–450)
PMV BLD AUTO: 10.2 FL (ref 6–12)
RBC # BLD AUTO: 3.6 10*6/MM3 (ref 3.77–5.28)
WBC NRBC COR # BLD AUTO: 8.64 10*3/MM3 (ref 3.4–10.8)

## 2024-10-24 PROCEDURE — 85025 COMPLETE CBC W/AUTO DIFF WBC: CPT | Performed by: ADVANCED PRACTICE MIDWIFE

## 2024-10-24 RX ORDER — IBUPROFEN 600 MG/1
600 TABLET, FILM COATED ORAL EVERY 6 HOURS PRN
Qty: 20 TABLET | Refills: 0 | Status: SHIPPED | OUTPATIENT
Start: 2024-10-24

## 2024-10-24 RX ORDER — PSEUDOEPHEDRINE HCL 30 MG
100 TABLET ORAL 2 TIMES DAILY
Qty: 60 CAPSULE | Refills: 1 | Status: SHIPPED | OUTPATIENT
Start: 2024-10-24

## 2024-10-24 RX ADMIN — DOCUSATE SODIUM 100 MG: 100 CAPSULE, LIQUID FILLED ORAL at 08:15

## 2024-10-24 RX ADMIN — IBUPROFEN 600 MG: 600 TABLET, FILM COATED ORAL at 04:39

## 2024-10-24 NOTE — DISCHARGE SUMMARY
[facility]  Vaginal Delivery Discharge Summary      Patient: Hillary Arenas      MR#:[id number]  Admission  Diagnosis: term labor  Discharge Diagnosis: Same    Date of Admission: 10/22/2024  Date of Discharge:  10/24/2024    Procedures:  Vaginal, Spontaneous    10/23/2024   12:14 AM     Service:  Obstetrics    Hospital Course:  Patient underwent vaginal delivery and remained in the hospital for 2 days.  During that time she remained afebrile and hemodynamically stable.  On the day of discharge, she was eating, ambulating and voiding without difficulty.      Lab Results   Component Value Date    WBC 8.64 10/24/2024    HGB 11.5 (L) 10/24/2024    HCT 34.5 10/24/2024    MCV 95.8 10/24/2024     10/24/2024     Results from last 7 days   Lab Units 10/22/24  1912   ABO TYPING  A   RH TYPING  Positive   ANTIBODY SCREEN  Negative       Discharge Medications     Discharge Medications        New Medications        Instructions Start Date   docusate sodium 100 MG capsule   100 mg, Oral, 2 Times Daily      ibuprofen 600 MG tablet  Commonly known as: ADVIL,MOTRIN   600 mg, Oral, Every 6 Hours PRN             Continue These Medications        Instructions Start Date   Prenatal 27-1 27-1 MG tablet tablet   1 tablet, Daily             Stop These Medications      Acetaminophen Extra Strength 500 MG tablet     ferrous sulfate 325 (65 FE) MG tablet     ondansetron 4 MG tablet  Commonly known as: ZOFRAN              Discharge Disposition:  To Home    Discharge Condition:  Stable    Discharge Diet: regular    Activity at Discharge: Pelvic rest    Follow-up Appointments  6 weeks      Ladi Hubbard MD  10/24/24  09:54 EDT

## 2024-11-06 ENCOUNTER — MATERNAL SCREENING (OUTPATIENT)
Dept: CALL CENTER | Facility: HOSPITAL | Age: 39
End: 2024-11-06
Payer: COMMERCIAL

## 2024-11-06 NOTE — OUTREACH NOTE
Maternal Screening Survey      Flowsheet Row Responses   Facility patient discharged from? [facility]   Attempt successful? No   Unsuccessful attempts Attempt 1              SPENCER ECHAVARRIA - Registered Nurse

## 2024-11-06 NOTE — OUTREACH NOTE
Maternal Screening Survey      Flowsheet Row Responses   Facility patient discharged from? [facility]   Attempt successful? No   Unsuccessful attempts Attempt 2              SPENCER ECHAVARRIA - Registered Nurse

## 2024-11-07 ENCOUNTER — MATERNAL SCREENING (OUTPATIENT)
Dept: CALL CENTER | Facility: HOSPITAL | Age: 39
End: 2024-11-07
Payer: COMMERCIAL

## 2024-11-07 NOTE — OUTREACH NOTE
Maternal Screening Survey      Flowsheet Row Responses   Facility patient discharged from? [facility]   Attempt successful? No   Unsuccessful attempts Attempt 3   Revoke Decline to participate              Carolina UNGER - Registered Nurse